# Patient Record
(demographics unavailable — no encounter records)

---

## 2024-11-11 NOTE — IMPRESSION
[TextEntry] : Interpretation: Spirometry and flow volume loop are normal. Bronchodilator response, lung volumes, and diffusion capacity were not checked. There is no prior study to compare.  Of note, the patient had difficulty to perform procedure. This test does not meet ATS criteria of acceptability and reproducibility.

## 2024-11-11 NOTE — ASSESSMENT
[FreeTextEntry1] : Shanell Vides is 6 years old female with history of possible convulsion. From pulmonary and sleep perspective, he has history of intermittent left sided chest pain with associated shortness of breath, childhood wheezing, snoring and restless sleep.  As far as chest pain is concerned, the exact etiology is unclear at this time however intermittent costochondritis seems to be the most likely cause. Therefore, it requires further evaluation. Of note, she had a PFT which is normal.  As far as sleep is concerned, her symptoms are concerning for sleep disordered breathing and therefore require further evaluation.   Plan: 1. I have ordered a chest x-ray, once available I will review and plan accordingly. 2. In the meantime, the mother is advised to use ibuprofen as needed for pain.  3. I will also order a sleep study and we will schedule the sleep study.  4. I will see her in 3 months.

## 2024-11-11 NOTE — REASON FOR VISIT
[TextEntry] : Shanell Vides is 6 years old female with history of possible convulsion. From pulmonary and sleep perspective, he has history of intermittent left sided chest pain with associated shortness of breath, childhood wheezing, snoring and restless sleep. The patient is here for an initial evaluation. The history was obtained from the patient and mother.

## 2024-11-11 NOTE — END OF VISIT
[FreeTextEntry3] : All information documented by staff members, review of systems, past medical history, family history, social history, surgical history, medications, allergies, immunizations and vital signs were reviewed. I obtained the history and examined the patient. I reviewed the chart for pertinent history, lab results, radiological images and procedures. The assessment and plan was discussed with the family.  Code selected for this visit is based on time. The total time spent performing E/M services today is 45 minutes. Time includes preparing to see patient, reviewing diagnostic studies and records, direct face-to-face visit, completing orders, medication reconciliation, prescription management, and care coordination. Time spent documenting clinical information in the record is also included. Time reported does not include any separately reported service or time.

## 2024-11-11 NOTE — PHYSICAL EXAM
[TextEntry] : General: Well-developed, well-nourished, no acute distress, well-appearing, active and playful. Eyes: Extra-ocular movements intact, conjunctiva clear. Head: normocephalic, atraumatic. Ear: Right ear: Normal external ear, non-erythematous tympanic membrane Left ear: Normal external ear, non-erythematous tympanic membrane Nose: Right nose: Nasal cavity is patent, normal nasal mucosa, turbinates are not enlarged Left nose: Nasal cavity is patent, Normal nasal mucosa, turbinates are not enlarged Throat: Oral mucous membranes moist and pink, no oral lesions, normal dentition and gingiva, tonsils 2+ BL. Neck: Supple, trachea midline, no masses. Cardiovascular: Regular rate and rhythm, no murmurs appreciated; capillary refill < 2 second; 2+ radial pulses bilaterally; no edema. Respiratory: No deformities of the chest, symmetric chest rise, breathing non-labored, no retractions, no nasal flaring, no tracheal tug, lungs clear to auscultation BL, good aeration BL through all lung fields, no crackles, no wheezes, no upper airway transmitted sounds. GI: Abdomen soft, nontender, nondistended, normal bowel sounds, no masses, no organomegaly. Lymph: No cervical lymphadenopathy appreciated. Musculoskeletal: Normal muscle tone and strength. Extremities: Warm, well-perfused, no digital clubbing or cyanosis. Skin: Warm, dry, no rashes. Neurology: Awake, alert, and interactive, normal affect, developmentally appropriate.

## 2024-11-11 NOTE — HISTORY OF PRESENT ILLNESS
[FreeTextEntry1] :  Shanell has history of intermittent left lower chest pain and associated shortness of breath for last few months. The symptoms are intermittent, occurred once every 2 weeks, last for a couple of days and wakes her up in the middle of the night. The cough makes it worse, and ibuprofen tends to help her. She also develops fever during those episodes. She does well in between those episodes. There is no history of cough, wheezing, or shortness of breath in between those episodes.   She has history of recurrent wheezing until the age of 4 years and did not have any wheezing since then. She did not use albuterol since then. There is no history of feeding difficulties including cough or choking with feeding. There is no history of noisy breathing. There is no history of recurrent pneumonias.  ALLERGY SYMPTOMS: There is no history of nasal and ocular allergy symptoms. There is no history of itchy or watery eyes, itchy or watery nose, and chronic nasal congestion. The patient does not use any antiallergic and/or nasal steroids.  SLEEP SYMPTOMS: There is history of intermittent loud snoring, respiratory pauses during sleep, and restless sleep. There is no history of sleep onset and maintenance insomnia. The patient did not have a sleep study.  OTHER SYMPTOMS: The patient is feeding, growing and developing well. There is no history of reflux/GERD. There is no history of, FTT, developmental delay, seizure or neurological disorder.  NEONATOLOGY HISTORY: The patient was born at 40 weeks via emergency  for breach and FTP. The patient's birth weight was 7 lb and 11 oz. There were no complications during pregnancy and after delivery.  PAST MEDICAL & SURGICAL HISTROY: There is no history of adenotonsillectomy or any other surgery.  FAMILY HISTORY: There is family history of asthma (mother), eczema and severe allergies. There is no family history of NIKIA, RLS and narcolepsy.  SOCIAL HISTORY: The patient lives with the parents and siblings. There is no smoke or pet exposure at home.

## 2024-11-25 NOTE — REASON FOR VISIT
[Initial Consultation] : an initial consultation for [Mother] : mother [Chest Pain] : chest pain [Dizziness/Lightheadedness] : dizziness/lightheadedness

## 2024-11-29 NOTE — REVIEW OF SYSTEMS
[Feeling Poorly] : not feeling poorly (malaise) [Fever] : no fever [Wgt Loss (___ Lbs)] : no recent weight loss [Pallor] : not pale [Eye Discharge] : no eye discharge [Redness] : no redness [Change in Vision] : no change in vision [Nasal Stuffiness] : no nasal congestion [Sore Throat] : no sore throat [Earache] : no earache [Loss Of Hearing] : no hearing loss [Nosebleeds] : no epistaxis [Cyanosis] : no cyanosis [Edema] : no edema [Diaphoresis] : not diaphoretic [Chest Pain] : chest pain  or discomfort [Exercise Intolerance] : no persistence of exercise intolerance [Palpitations] : palpitations [Orthopnea] : no orthopnea [Fast HR] : tachycardia [Tachypnea] : tachypneic [Wheezing] : no wheezing [Cough] : no cough [Shortness Of Breath] : expressed as feeling short of breath [Being A Poor Eater] : poor eater [Vomiting] : vomiting [Diarrhea] : no diarrhea [Decrease In Appetite] : appetite not decreased [Abdominal Pain] : abdominal pain [Fainting (Syncope)] : no fainting [Seizure] : seizures [Headache] : no headache [Dizziness] : dizziness [Limping] : no limping [Joint Pains] : no arthralgias [Joint Swelling] : no joint swelling [Rash] : no rash [Wound problems] : no wound problems [Skin Peeling] : no skin peeling [Easy Bruising] : no tendency for easy bruising [Swollen Glands] : no lymphadenopathy [Easy Bleeding] : no ~M tendency for easy bleeding [Sleep Disturbances] : ~T sleep disturbances [Hyperactive] : no hyperactive behavior [Failure To Thrive] : no failure to thrive [Short Stature] : short stature was not noted [Jitteriness] : no jitteriness [Heat/Cold Intolerance] : no temperature intolerance [Dec Urine Output] : no oliguria

## 2024-11-29 NOTE — CARDIOLOGY SUMMARY
[de-identified] : 11/25/2024 [FreeTextEntry1] : Normal sinus rhythm, HR 94bpm, normal axes and intervals. Normal ECG

## 2024-11-29 NOTE — CARDIOLOGY SUMMARY
[de-identified] : 11/25/2024 [FreeTextEntry1] : Normal sinus rhythm, HR 94bpm, normal axes and intervals. Normal ECG

## 2024-11-29 NOTE — PHYSICAL EXAM
[General Appearance - Alert] : alert [General Appearance - Well Nourished] : well nourished [General Appearance - In No Acute Distress] : in no acute distress [General Appearance - Well Developed] : well developed [General Appearance - Well-Appearing] : well appearing [Appearance Of Head] : the head was normocephalic [Facies] : there were no dysmorphic facial features [Sclera] : the sclera were normal [Outer Ear] : the ears and nose were normal in appearance [Examination Of The Oral Cavity] : mucous membranes were moist and pink [Auscultation Breath Sounds / Voice Sounds] : breath sounds clear to auscultation bilaterally [Normal Chest Appearance] : the chest was normal in appearance [Apical Impulse] : quiet precordium with normal apical impulse [Heart Rate And Rhythm] : normal heart rate and rhythm [Heart Sounds] : normal S1 and S2 [Heart Sounds Pericardial Friction Rub] : no pericardial rub [Heart Sounds Gallop] : no gallops [Edema] : no edema [Arterial Pulses] : normal upper and lower extremity pulses with no pulse delay [Heart Sounds Click] : no clicks [Capillary Refill Test] : normal capillary refill [Systolic] : systolic [II] : a grade 2/6 [LMSB] : LMSB  [Ejection] : ejection [Vibratory] : vibratory [Bowel Sounds] : normal bowel sounds [Abdomen Soft] : soft [Nondistended] : nondistended [Abdomen Tenderness] : non-tender [Nail Clubbing] : no clubbing  or cyanosis of the fingers [Motor Tone] : normal muscle strength and tone [] : no rash [Skin Lesions] : no lesions [Skin Turgor] : normal turgor [Demonstrated Behavior - Infant Nonreactive To Parents] : interactive [Mood] : mood and affect were appropriate for age [Demonstrated Behavior] : normal behavior

## 2024-11-29 NOTE — PHYSICAL EXAM
[General Appearance - Alert] : alert [General Appearance - In No Acute Distress] : in no acute distress [General Appearance - Well Nourished] : well nourished [General Appearance - Well Developed] : well developed [General Appearance - Well-Appearing] : well appearing [Appearance Of Head] : the head was normocephalic [Facies] : there were no dysmorphic facial features [Sclera] : the sclera were normal [Outer Ear] : the ears and nose were normal in appearance [Examination Of The Oral Cavity] : mucous membranes were moist and pink [Auscultation Breath Sounds / Voice Sounds] : breath sounds clear to auscultation bilaterally [Normal Chest Appearance] : the chest was normal in appearance [Apical Impulse] : quiet precordium with normal apical impulse [Heart Rate And Rhythm] : normal heart rate and rhythm [Heart Sounds] : normal S1 and S2 [Heart Sounds Pericardial Friction Rub] : no pericardial rub [Heart Sounds Gallop] : no gallops [Edema] : no edema [Arterial Pulses] : normal upper and lower extremity pulses with no pulse delay [Heart Sounds Click] : no clicks [Capillary Refill Test] : normal capillary refill [Systolic] : systolic [II] : a grade 2/6 [LMSB] : LMSB  [Ejection] : ejection [Vibratory] : vibratory [Bowel Sounds] : normal bowel sounds [Abdomen Soft] : soft [Nondistended] : nondistended [Abdomen Tenderness] : non-tender [Nail Clubbing] : no clubbing  or cyanosis of the fingers [Motor Tone] : normal muscle strength and tone [] : no rash [Skin Lesions] : no lesions [Skin Turgor] : normal turgor [Demonstrated Behavior - Infant Nonreactive To Parents] : interactive [Mood] : mood and affect were appropriate for age [Demonstrated Behavior] : normal behavior

## 2024-11-29 NOTE — CONSULT LETTER
[Today's Date] : [unfilled] [Name] : Name: [unfilled] [] : : ~~ [Today's Date:] : [unfilled] [Dear  ___:] : Dear Dr. [unfilled]: [Consult] : I had the pleasure of evaluating your patient, [unfilled]. My full evaluation follows. [Consult - Single Provider] : Thank you very much for allowing me to participate in the care of this patient. If you have any questions, please do not hesitate to contact me. [Sincerely,] : Sincerely, [FreeTextEntry4] :  Yoselin Jackson MD [FreeTextEntry5] : [address], [place] [address] [de-identified] : Mimi Baker MD, FRCPC, FAAP Pediatric Cardiology [facility] [email or web address]

## 2024-11-29 NOTE — CONSULT LETTER
[Today's Date] : [unfilled] [Name] : Name: [unfilled] [] : : ~~ [Today's Date:] : [unfilled] [Dear  ___:] : Dear Dr. [unfilled]: [Consult] : I had the pleasure of evaluating your patient, [unfilled]. My full evaluation follows. [Consult - Single Provider] : Thank you very much for allowing me to participate in the care of this patient. If you have any questions, please do not hesitate to contact me. [Sincerely,] : Sincerely, [FreeTextEntry4] :  Yoselin Jackson MD [FreeTextEntry5] : [address], [place] [address] [de-identified] : Mimi Baker MD, FRCPC, FAAP Pediatric Cardiology [facility] [email or web address]

## 2025-02-18 NOTE — RESULTS/DATA
[TextEntry] : Chest x-ray 2024: Clear lungs without evidence of acute cardiopulmonary disease.  [id number]. Normal sinus rhythm, HR 94bpm, normal axes and intervals. Normal ECG.

## 2025-02-18 NOTE — HISTORY OF PRESENT ILLNESS
[FreeTextEntry1] : Shanell is doing better since last visit. She did not have any chest pain since last visit. She was seen by the cardiologist and is cleared for any heart issues. There is no history of chronic cough, recurrent wheezing or shortness of breath since last visit.   From sleep perspective, she continues to have snoring and restless sleep. She feels tired during the daytime. There is no history of insomnia or parasomnia. She had her sleep study schedule on 2025.   RESPIRATORY HISTORY: There is history of intermittent chest pain and shortness of breath with the physical activity. She has history of recurrent wheezing until the age of 4 years and did not have any wheezing since then. She did not use albuterol since then. There is no history of feeding difficulties including cough or choking with feeding. There is no history of noisy breathing. There is no history of recurrent pneumonias.  ALLERGY SYMPTOMS: There is no history of nasal and ocular allergy symptoms. There is no history of itchy or watery eyes, itchy or watery nose, and chronic nasal congestion. The patient does not use any antiallergic and/or nasal steroids.  SLEEP SYMPTOMS: There is history of intermittent loud snoring, respiratory pauses during sleep, and restless sleep. There is no history of sleep onset and maintenance insomnia. The patient did not have a sleep study.  OTHER SYMPTOMS: The patient is feeding, growing and developing well. There is no history of reflux/GERD. There is no history of, FTT, developmental delay, seizure or neurological disorder.  NEONATOLOGY HISTORY: The patient was born at 40 weeks via emergency  for breach and FTP. The patient's birth weight was 7 lb and 11 oz. There were no complications during pregnancy and after delivery.  PAST MEDICAL & SURGICAL HISTROY: There is no history of adenotonsillectomy or any other surgery.  FAMILY HISTORY: There is family history of asthma (mother), eczema and severe allergies. There is no family history of NIKIA, RLS and narcolepsy.  SOCIAL HISTORY: The patient lives with the parents and siblings. There is no smoke or pet exposure at home.

## 2025-02-18 NOTE — REASON FOR VISIT
[TextEntry] : Shanell Vides is 6 years old female with history of possible convulsion. From pulmonary and sleep perspective, he has history of exercise induced chest pain and shortness of breath, snoring and restless sleep. The patient is here for a routine follow up and the last clinic visit was on 11/11/2024. The history was obtained from the patient and mother.

## 2025-02-18 NOTE — ASSESSMENT
[FreeTextEntry1] : Shanell Vides is 6 years old female with history of possible convulsion. From pulmonary and sleep perspective, he has history of exercise induced chest pain and shortness of breath, snoring and restless sleep.   Shanell is doing better since last visit. She did not have any chest pain since last visit. She was seen by the cardiologist and is cleared for any heart issues. There is no history of chronic cough, recurrent wheezing or shortness of breath since last visit.   From sleep perspective, she continues to have snoring and restless sleep. She feels tired during the daytime. There is no history of insomnia or parasomnia. She had her sleep study schedule on 08/22/2025.   Plan: 1. Her sleep study is scheduled for 08/22/2025 at 6:30 PM.  2. I discussed the results of the chest x-ray and is normal.  3. I will see her in September to discuss the results of the sleep study and management if needed.

## 2025-02-18 NOTE — END OF VISIT
[FreeTextEntry3] : All information documented by staff members, review of systems, past medical history, family history, social history, surgical history, medications, allergies, immunizations and vital signs were reviewed. I obtained the history and examined the patient. I reviewed the chart for pertinent history, lab results, radiological images and procedures. The assessment and plan was discussed with the family.  Code selected for this visit is based on time. The total time spent performing E/M services today is 30 minutes. Time includes preparing to see patient, reviewing diagnostic studies and records, direct face-to-face visit, completing orders, medication reconciliation, prescription management, and care coordination. Time spent documenting clinical information in the record is also included. Time reported does not include any separately reported service or time.    Isotretinoin Pregnancy And Lactation Text: This medication is Pregnancy Category X and is considered extremely dangerous during pregnancy. It is unknown if it is excreted in breast milk.

## 2025-02-18 NOTE — IMPRESSION
[TextEntry] : Interpretation: Spirometry and flow volume loop are normal. Bronchodilator response, lung volumes, and diffusion capacity were not checked. Compared to the prior study on 11/11/2024, there has been no significant change. Of note, the patient had difficulty to perform procedure. This test does not meet ATS criteria of acceptability and reproducibility.